# Patient Record
(demographics unavailable — no encounter records)

---

## 2025-04-09 NOTE — ASSESSMENT
[FreeTextEntry1] : 60-year-old who primarily comes in because over the last 2 to 3 weeks he has had intermittent sharp pains along the ulna that started without any specific trauma or injury but he had pressure on his forearm traveling on a plane for a long time. There is no evidence of fracture or bone lesions and no palpable masses. Perhaps there is some bruising or irritation of the nerve that is sensitive when it is touched. Recommended keeping pressure off of it for the next several weeks and trying to avoid anything that would aggravate it and see if it just resolves.  If it persist we can always workup further. He has some anteromedial left knee pain.  We did not get x-rays today because his good motion and exam was unremarkable.  If pain persists I will get x-rays to see if there is early arthritis.  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  He has hyperextension but the knee is otherwise stable.  No mechanical or inflammatory changes.  I recommended strengthening the lower extremity to help with pain.  His weight is good.  We talked a bit about treatment for arthritis in general. Follow-up as needed

## 2025-04-09 NOTE — PHYSICAL EXAM
[UE/LE] : Sensory: Intact in bilateral upper & lower extremities [Normal RUE] : Right Upper Extremity: No scars, rashes, lesions, ulcers, skin intact [Normal LUE] : Left Upper Extremity: No scars, rashes, lesions, ulcers, skin intact [Normal RLE] : Right Lower Extremity: No scars, rashes, lesions, ulcers, skin intact [Normal LLE] : Left Lower Extremity: No scars, rashes, lesions, ulcers, skin intact [Normal Touch] : sensation intact for touch [Normal] : Oriented to person, place, and time, insight and judgement were intact and the affect was normal [de-identified] : Left elbow and forearm No edema, ecchymoses, erythema, palpable masses or deformity. Intact elbow, wrist and finger range of motion without any limitation or pain. There was some tenderness along the mid ulna that only hurt when he was touched or palpated in a particular way and somewhat difficult to reproduce. Motor and sensation are intact throughout the upper extremity. Negative Tinel's.  Left knee Normal gait. He can squat well without pain. No edema, ecchymoses, erythema, effusion. Tender medial patellar facet inferiorly and mildly anteromedial joint line.  No other tenderness. Ia Lachman.  Negative anterior and posterior drawer.  Normal varus and valgus laxity.  Intact extensor mechanism.  Negative Darci. Knee range of motion is with 5 to 10 degrees hyperextension slightly more left than right and flexion to 135 degrees with some clicks but no significant crepitus. Normal neurovascular exam [de-identified] :  X-rays of the left forearm AP and lateral views ordered today for pain along the ulna were performed and reviewed and showed no visible fractures or bone lesions or soft tissue abnormalities.  Unremarkable.

## 2025-04-09 NOTE — PHYSICAL EXAM
[UE/LE] : Sensory: Intact in bilateral upper & lower extremities [Normal RUE] : Right Upper Extremity: No scars, rashes, lesions, ulcers, skin intact [Normal LUE] : Left Upper Extremity: No scars, rashes, lesions, ulcers, skin intact [Normal RLE] : Right Lower Extremity: No scars, rashes, lesions, ulcers, skin intact [Normal LLE] : Left Lower Extremity: No scars, rashes, lesions, ulcers, skin intact [Normal Touch] : sensation intact for touch [Normal] : Oriented to person, place, and time, insight and judgement were intact and the affect was normal [de-identified] : Left elbow and forearm No edema, ecchymoses, erythema, palpable masses or deformity. Intact elbow, wrist and finger range of motion without any limitation or pain. There was some tenderness along the mid ulna that only hurt when he was touched or palpated in a particular way and somewhat difficult to reproduce. Motor and sensation are intact throughout the upper extremity. Negative Tinel's.  Left knee Normal gait. He can squat well without pain. No edema, ecchymoses, erythema, effusion. Tender medial patellar facet inferiorly and mildly anteromedial joint line.  No other tenderness. Ia Lachman.  Negative anterior and posterior drawer.  Normal varus and valgus laxity.  Intact extensor mechanism.  Negative Darci. Knee range of motion is with 5 to 10 degrees hyperextension slightly more left than right and flexion to 135 degrees with some clicks but no significant crepitus. Normal neurovascular exam [de-identified] :  X-rays of the left forearm AP and lateral views ordered today for pain along the ulna were performed and reviewed and showed no visible fractures or bone lesions or soft tissue abnormalities.  Unremarkable.

## 2025-04-09 NOTE — HISTORY OF PRESENT ILLNESS
[de-identified] : Mr. Vasquez is a 61 y/o who comes in for a new issue: LEFT forearm that started about 3 weeks without injury. He was leaning on arm on a long flight which she thought may have aggravated it.  No numbness or tingling.. Pain can be 9/10 when he feels it intermittently.  Most of the time there is no pain but he will get pain if he presses his forearm on something firm like an arm chair or table and it hits the spot.  He also gets some left anteromedial knee pain with a lot of walking.  It is somewhat tender to the touch. No swelling or locking or buckling.  No prior knee injuries.  He does do a lot of walking and exercises..